# Patient Record
Sex: FEMALE | Race: WHITE | NOT HISPANIC OR LATINO | Employment: FULL TIME | URBAN - METROPOLITAN AREA
[De-identification: names, ages, dates, MRNs, and addresses within clinical notes are randomized per-mention and may not be internally consistent; named-entity substitution may affect disease eponyms.]

---

## 2023-10-24 ENCOUNTER — ANCILLARY PROCEDURE (OUTPATIENT)
Dept: RADIOLOGY | Facility: CLINIC | Age: 58
End: 2023-10-24
Payer: COMMERCIAL

## 2023-10-24 DIAGNOSIS — C50.212 MALIGNANT NEOPLASM OF UPPER-INNER QUADRANT OF LEFT FEMALE BREAST (MULTI): ICD-10-CM

## 2023-10-24 PROCEDURE — G0279 TOMOSYNTHESIS, MAMMO: HCPCS | Performed by: RADIOLOGY

## 2023-10-24 PROCEDURE — 77061 BREAST TOMOSYNTHESIS UNI: CPT | Mod: RT

## 2023-10-24 PROCEDURE — 77065 DX MAMMO INCL CAD UNI: CPT | Performed by: RADIOLOGY

## 2024-03-08 DIAGNOSIS — C50.912 MALIGNANT NEOPLASM OF LEFT FEMALE BREAST, UNSPECIFIED ESTROGEN RECEPTOR STATUS, UNSPECIFIED SITE OF BREAST (MULTI): ICD-10-CM

## 2024-03-11 ENCOUNTER — OFFICE VISIT (OUTPATIENT)
Dept: HEMATOLOGY/ONCOLOGY | Facility: CLINIC | Age: 59
End: 2024-03-11
Payer: COMMERCIAL

## 2024-03-11 ENCOUNTER — LAB (OUTPATIENT)
Dept: LAB | Facility: CLINIC | Age: 59
End: 2024-03-11
Payer: COMMERCIAL

## 2024-03-11 VITALS
BODY MASS INDEX: 27.29 KG/M2 | OXYGEN SATURATION: 98 % | HEART RATE: 69 BPM | WEIGHT: 152.12 LBS | SYSTOLIC BLOOD PRESSURE: 118 MMHG | DIASTOLIC BLOOD PRESSURE: 71 MMHG | RESPIRATION RATE: 20 BRPM | TEMPERATURE: 97.3 F

## 2024-03-11 DIAGNOSIS — Z17.0 MALIGNANT NEOPLASM OF UPPER-OUTER QUADRANT OF LEFT BREAST IN FEMALE, ESTROGEN RECEPTOR POSITIVE (MULTI): Primary | ICD-10-CM

## 2024-03-11 DIAGNOSIS — C50.412 MALIGNANT NEOPLASM OF UPPER-OUTER QUADRANT OF LEFT BREAST IN FEMALE, ESTROGEN RECEPTOR POSITIVE (MULTI): Primary | ICD-10-CM

## 2024-03-11 DIAGNOSIS — C50.912 MALIGNANT NEOPLASM OF LEFT FEMALE BREAST, UNSPECIFIED ESTROGEN RECEPTOR STATUS, UNSPECIFIED SITE OF BREAST (MULTI): ICD-10-CM

## 2024-03-11 LAB
ALBUMIN SERPL BCP-MCNC: 3.9 G/DL (ref 3.4–5)
ALP SERPL-CCNC: 77 U/L (ref 33–110)
ALT SERPL W P-5'-P-CCNC: 11 U/L (ref 7–45)
ANION GAP SERPL CALC-SCNC: <7 MMOL/L (ref 10–20)
AST SERPL W P-5'-P-CCNC: 16 U/L (ref 9–39)
BASOPHILS # BLD AUTO: 0.06 X10*3/UL (ref 0–0.1)
BASOPHILS NFR BLD AUTO: 0.8 %
BILIRUB SERPL-MCNC: 0.4 MG/DL (ref 0–1.2)
BUN SERPL-MCNC: 11 MG/DL (ref 6–23)
CALCIUM SERPL-MCNC: 9.5 MG/DL (ref 8.6–10.3)
CHLORIDE SERPL-SCNC: 104 MMOL/L (ref 98–107)
CO2 SERPL-SCNC: 30 MMOL/L (ref 21–32)
CREAT SERPL-MCNC: 0.73 MG/DL (ref 0.5–1.05)
EGFRCR SERPLBLD CKD-EPI 2021: >90 ML/MIN/1.73M*2
EOSINOPHIL # BLD AUTO: 0.11 X10*3/UL (ref 0–0.7)
EOSINOPHIL NFR BLD AUTO: 1.5 %
ERYTHROCYTE [DISTWIDTH] IN BLOOD BY AUTOMATED COUNT: 13.6 % (ref 11.5–14.5)
GLUCOSE SERPL-MCNC: 88 MG/DL (ref 74–99)
HCT VFR BLD AUTO: 44.4 % (ref 36–46)
HGB BLD-MCNC: 14.6 G/DL (ref 12–16)
IMM GRANULOCYTES # BLD AUTO: 0.02 X10*3/UL (ref 0–0.7)
IMM GRANULOCYTES NFR BLD AUTO: 0.3 % (ref 0–0.9)
LYMPHOCYTES # BLD AUTO: 3.42 X10*3/UL (ref 1.2–4.8)
LYMPHOCYTES NFR BLD AUTO: 45.6 %
MCH RBC QN AUTO: 30.7 PG (ref 26–34)
MCHC RBC AUTO-ENTMCNC: 32.9 G/DL (ref 32–36)
MCV RBC AUTO: 93 FL (ref 80–100)
MONOCYTES # BLD AUTO: 0.59 X10*3/UL (ref 0.1–1)
MONOCYTES NFR BLD AUTO: 7.9 %
NEUTROPHILS # BLD AUTO: 3.3 X10*3/UL (ref 1.2–7.7)
NEUTROPHILS NFR BLD AUTO: 43.9 %
NRBC BLD-RTO: 0 /100 WBCS (ref 0–0)
PLATELET # BLD AUTO: 280 X10*3/UL (ref 150–450)
POTASSIUM SERPL-SCNC: 3.9 MMOL/L (ref 3.5–5.3)
PROT SERPL-MCNC: 6.6 G/DL (ref 6.4–8.2)
RBC # BLD AUTO: 4.76 X10*6/UL (ref 4–5.2)
SODIUM SERPL-SCNC: 136 MMOL/L (ref 136–145)
WBC # BLD AUTO: 7.5 X10*3/UL (ref 4.4–11.3)

## 2024-03-11 PROCEDURE — 80053 COMPREHEN METABOLIC PANEL: CPT

## 2024-03-11 PROCEDURE — 99213 OFFICE O/P EST LOW 20 MIN: CPT | Performed by: INTERNAL MEDICINE

## 2024-03-11 PROCEDURE — 85025 COMPLETE CBC W/AUTO DIFF WBC: CPT

## 2024-03-11 PROCEDURE — 36415 COLL VENOUS BLD VENIPUNCTURE: CPT

## 2024-03-11 ASSESSMENT — PAIN SCALES - GENERAL: PAINLEVEL: 0-NO PAIN

## 2024-03-11 NOTE — PROGRESS NOTES
LOCATION:  [facility].     HEMATOLOGY & ONCOLOGY PROBLEMS:  1. Stage IA (pT1c,pN0,Mx) ER/WY positive, left breast lobular carcinoma.       a. Left mastectomy with SNLD in Aug 2017.      b. Low Oncotype Dx score of 5 (Low risk category)      c. Hormonal therapy with Tamoxifen from Sep 2017 to July 2022.  2. Hot flashes and mood irritability due to Tamoxifen.     CHIEF COMPLAINT:    The patient is in the clinic today for management of left breast carcinoma.                  HISTORY:   Ms Orlando is 58-year-old pleasant female with left breast invasive lobular carcinoma. She turned 52 and had her Ist ever routine screening mammogram which showed 2 focal asymmetries medially in the left breast. Follow-up spot compression views confirmed  the spiculated mass in the 9:00 position. F/u ultrasound-guided needle localization and excisional biopsy confirmed  moderately differentiated, grade 2, 1.7 cm, strongly ER/WY positive, invasive lobular carcinoma. Tumor was focally involving the anterior  margin of excision and was within less than 1 cm from the superior margin. Skin was negative for carcinoma.  She opted for mastectomy, which was done in the August 2017. There was no finding of the significant residual disease and all the lymph nodes  were negative. There is no previous personal or family history of breast or ovarian related problems.  No history of use of estrogen replacement therapy or birth control pills. Additional testing revealed a low Oncotype DX score of 5 consistent with low  risk category. After medical oncology evaluation, she was treated with extended hormonal therapy with tamoxifen for 5 years from 3458-1877. Off note, she was tati menopausal at the time of diagnosis of breast cancer.  She did have problems with persistent hot flashes and night sweats with tamoxifen which immediately improved after the discontinuation.     INTERVAL HISTORY:  Occasional hot flashes and night   sweats but overall much better after the discontinuation of tamoxifen.  No history of nausea, vomiting, fever, night sweats, diarrhea, rash, anorexia or weight loss. As per the patient, she has cut down but not fully quit smoking yet due to family-related stress.        PAST MEDICAL HISTORY:   1. Questionable peptic ulcer disease.  2. Breast cancer as detailed above.      SOCIAL HISTORY:    for 37 years and lives in [place]. Smokes about 1/3rd of a pack a day for the last 35 years. Social alcohol intake. 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     FAMILY HISTORY:    Father  at age 72 from bladder cancer and mother  at age 73 from esophageal cancer. 3 siblings. Her sister had a history of myocardial infarction. 3 sons. One of her son  at age 31 secondary to long-term complications from congenital abnormalities.  No other specific history of bleeding, clotting or malignant disorder in the family.     OB/GYN HISTORY:   Perimenopausal. G3. P3. Menarche at 14. Menopause at age 52. No history of use of hormonal replacement therapy.      REVIEW OF SYSTEMS:  Pertinent finding as per the history above. All other systems have been reviewed and generally negative and noncontributory.     ALLERGY & MEDICATIONS:  Allergies and latest outpatient medications list were reviewed in the EMR.    VITAL SIGNS  BSA: 1.75 meters squared  /71   Pulse 69   Temp 36.3 °C (97.3 °F) (Temporal)   Resp 20   Wt 69 kg (152 lb 1.9 oz)   SpO2 98%   BMI 27.29 kg/m²     PHYSICAL EXAMINATION:  Detailed examination not done.    LAB RESULTS:  CBC and metabolic profile from today is unremarkable. Last 3 sets of blood work were reviewed and the trend was noted.      RADIOLOGY RESULT:  Mammogram 10/24/2023  IMPRESSION:  1. Status post left mastectomy.  2. No malignancy right breast.  BI-RADS CATEGORY: Category:  2 Benign.  Recommendation:  Routine Screening Mammogram in 1 Year.  Recommended Date:  1  Year.  Laterality:  Bilateral.    CT Abdomen and Pelvis without Contrast [Aug 29 2023  5:30PM]  Impression:  Nonobstructive right renal calculus. No hydronephrosis.  The left ovary is mildly asymmetrically prominent when compared to the right ovary. This is a nonspecific finding. If there is associated left lower quadrant pain consider follow-up with pelvic ultrasound for further assessment.     Mild pulmonary emphysematous changes.     ASSESSMENT & PLAN:   1. Left breast invasive lobular carcinoma. Please refer to the details of initial presentation and workup as outlined above. In summary she turned 52 and had  her Ist ever routine screening mammogram which showed 2 focal asymmetries medially in the left breast. Follow-up spot compression views confirmed the spiculated mass in the 9:00 position. F/u ultrasound-guided needle localization and excisional biopsy  confirmed  moderately differentiated, grade 2, 1.7 cm, strongly ER/RI positive, invasive lobular carcinoma. Tumor was focally involving the anterior margin of excision and was within less than 1 cm from the superior margin. Skin was negative for carcinoma.   She opted for mastectomy, which was done in the August 2017. There was no finding of the significant residual disease and all the lymph nodes were negative. There is no previous personal or family history of breast or ovarian related problems.  No history  of use of estrogen replacement therapy or birth control pills. Additional testing revealed a low Oncotype DX score of 5 consistent with low risk category.  After medical oncology evaluation, she was treated with extended hormonal therapy with tamoxifen  for 5 years from 4900-9825. Off note, she was tati menopausal at the time of diagnosis of breast cancer.  She did have problems with persistent hot flashes and night sweats with tamoxifen which immediately improved after the discontinuation.     Blood work is essentially stable. Latest mammogram from Oct  2023 was normal.  On long-term basis, we will continue to monitor her closely with regular physical examination, blood work, right sided mammogram and DEXA scans.     2. Nicotine dependence. As stated above, she is trying to quit smoking. I strongly encouraged her to quit smoking completely.      3. Follow-up. She will return to the clinic in 1 year. She will be scheduled for F/U mammogram in end of Oct this year.     This note has been transcribed using Dragon voice recognition system and there is a possibility of unintentional typing misprints.

## 2025-03-11 ENCOUNTER — APPOINTMENT (OUTPATIENT)
Dept: HEMATOLOGY/ONCOLOGY | Facility: CLINIC | Age: 60
End: 2025-03-11
Payer: COMMERCIAL